# Patient Record
Sex: MALE | Race: WHITE
[De-identification: names, ages, dates, MRNs, and addresses within clinical notes are randomized per-mention and may not be internally consistent; named-entity substitution may affect disease eponyms.]

---

## 2019-06-26 ENCOUNTER — HOSPITAL ENCOUNTER (OUTPATIENT)
Dept: HOSPITAL 83 - LAB | Age: 44
Discharge: HOME | End: 2019-06-26
Attending: FAMILY MEDICINE
Payer: COMMERCIAL
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
## 2019-06-26 DIAGNOSIS — R51: ICD-10-CM

## 2019-06-26 DIAGNOSIS — F17.200: ICD-10-CM

## 2019-06-26 DIAGNOSIS — Z01.818: Primary | ICD-10-CM

## 2019-06-26 DIAGNOSIS — R05: ICD-10-CM
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
## 2019-06-26 DIAGNOSIS — S02.92XA: ICD-10-CM
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
## 2019-06-26 LAB
ALBUMIN SERPL-MCNC: 4.1 GM/DL (ref 3.1–4.5)
ALP SERPL-CCNC: 71 U/L (ref 45–117)
ALT SERPL W P-5'-P-CCNC: 25 U/L (ref 12–78)
AST SERPL-CCNC: 16 IU/L (ref 3–35)
BUN SERPL-MCNC: 14 MG/DL (ref 7–24)
CHLORIDE SERPL-SCNC: 109 MMOL/L (ref 98–107)
CREAT SERPL-MCNC: 1.02 MG/DL (ref 0.7–1.3)
ERYTHROCYTE [DISTWIDTH] IN BLOOD BY AUTOMATED COUNT: 11.9 % (ref 0–14.5)
HCT VFR BLD AUTO: 45 % (ref 42–52)
HGB BLD-MCNC: 15 G/DL (ref 14–18)
MCH RBC QN AUTO: 32.1 PG (ref 27–31)
MCHC RBC AUTO-ENTMCNC: 33.3 G/DL (ref 33–37)
MCV RBC AUTO: 96.4 FL (ref 80–94)
NRBC BLD QL AUTO: 0 % (ref 0–0)
PLATELET # BLD AUTO: 261 10*3/UL (ref 130–400)
PMV BLD AUTO: 10.8 FL (ref 9.6–12.3)
POTASSIUM SERPL-SCNC: 4.3 MMOL/L (ref 3.5–5.1)
PROT SERPL-MCNC: 7.4 GM/DL (ref 6.4–8.2)
RBC # BLD AUTO: 4.67 10*6/UL (ref 4.5–5.9)
SODIUM SERPL-SCNC: 144 MMOL/L (ref 136–145)
WBC NRBC COR # BLD AUTO: 8.6 10*3/UL (ref 4.8–10.8)

## 2020-02-20 ENCOUNTER — HOSPITAL ENCOUNTER (INPATIENT)
Dept: HOSPITAL 83 - ED | Age: 45
LOS: 1 days | Discharge: HOME | DRG: 203 | End: 2020-02-21
Attending: INTERNAL MEDICINE | Admitting: INTERNAL MEDICINE
Payer: COMMERCIAL

## 2020-02-20 VITALS — WEIGHT: 216.38 LBS | BODY MASS INDEX: 30.98 KG/M2 | HEIGHT: 70 IN

## 2020-02-20 VITALS — DIASTOLIC BLOOD PRESSURE: 79 MMHG

## 2020-02-20 VITALS — DIASTOLIC BLOOD PRESSURE: 70 MMHG | SYSTOLIC BLOOD PRESSURE: 108 MMHG

## 2020-02-20 VITALS — DIASTOLIC BLOOD PRESSURE: 73 MMHG

## 2020-02-20 DIAGNOSIS — E78.1: ICD-10-CM

## 2020-02-20 DIAGNOSIS — E66.8: ICD-10-CM

## 2020-02-20 DIAGNOSIS — M54.9: ICD-10-CM

## 2020-02-20 DIAGNOSIS — F41.1: ICD-10-CM

## 2020-02-20 DIAGNOSIS — Z71.6: ICD-10-CM

## 2020-02-20 DIAGNOSIS — Z82.49: ICD-10-CM

## 2020-02-20 DIAGNOSIS — Z79.899: ICD-10-CM

## 2020-02-20 DIAGNOSIS — Z79.82: ICD-10-CM

## 2020-02-20 DIAGNOSIS — F17.210: ICD-10-CM

## 2020-02-20 DIAGNOSIS — Z83.3: ICD-10-CM

## 2020-02-20 DIAGNOSIS — G89.29: ICD-10-CM

## 2020-02-20 DIAGNOSIS — G40.909: ICD-10-CM

## 2020-02-20 DIAGNOSIS — Z80.8: ICD-10-CM

## 2020-02-20 DIAGNOSIS — R07.89: Primary | ICD-10-CM

## 2020-02-20 LAB
ALBUMIN SERPL-MCNC: 3.9 GM/DL (ref 3.1–4.5)
ALP SERPL-CCNC: 85 U/L (ref 45–117)
ALT SERPL W P-5'-P-CCNC: 30 U/L (ref 12–78)
APTT PPP: 27.7 SECONDS (ref 20–32.1)
AST SERPL-CCNC: 21 IU/L (ref 3–35)
BASOPHILS # BLD AUTO: 0 10*3/UL (ref 0–0.1)
BASOPHILS NFR BLD AUTO: 0.4 % (ref 0–1)
BUN SERPL-MCNC: 13 MG/DL (ref 7–24)
CHLORIDE SERPL-SCNC: 108 MMOL/L (ref 98–107)
CREAT SERPL-MCNC: 0.92 MG/DL (ref 0.7–1.3)
EOSINOPHIL # BLD AUTO: 0.1 10*3/UL (ref 0–0.4)
EOSINOPHIL # BLD AUTO: 1.5 % (ref 1–4)
ERYTHROCYTE [DISTWIDTH] IN BLOOD BY AUTOMATED COUNT: 11.7 % (ref 0–14.5)
HCT VFR BLD AUTO: 41.9 % (ref 42–52)
HGB BLD-MCNC: 14.5 G/DL (ref 14–18)
INR BLD: 0.9 (ref 2–3.5)
LYMPHOCYTES # BLD AUTO: 2.9 10*3/UL (ref 1.3–4.4)
LYMPHOCYTES NFR BLD AUTO: 31 % (ref 27–41)
MCH RBC QN AUTO: 32.5 PG (ref 27–31)
MCHC RBC AUTO-ENTMCNC: 34.6 G/DL (ref 33–37)
MCV RBC AUTO: 93.9 FL (ref 80–94)
MONOCYTES # BLD AUTO: 0.7 10*3/UL (ref 0.1–1)
MONOCYTES NFR BLD MANUAL: 7.2 % (ref 3–9)
NEUT #: 5.6 10*3/UL (ref 2.3–7.9)
NEUT %: 59.6 % (ref 47–73)
NRBC BLD QL AUTO: 0 10*3/UL (ref 0–0)
PLATELET # BLD AUTO: 261 10*3/UL (ref 130–400)
PMV BLD AUTO: 10.4 FL (ref 9.6–12.3)
POTASSIUM SERPL-SCNC: 3.8 MMOL/L (ref 3.5–5.1)
PROT SERPL-MCNC: 7.3 GM/DL (ref 6.4–8.2)
RBC # BLD AUTO: 4.46 10*6/UL (ref 4.5–5.9)
SODIUM SERPL-SCNC: 139 MMOL/L (ref 136–145)
TROPONIN I SERPL-MCNC: < 0.015 NG/ML (ref ?–0.04)
WBC NRBC COR # BLD AUTO: 9.4 10*3/UL (ref 4.8–10.8)

## 2020-02-20 NOTE — NUR
A 44, admitted to , under the
services of MARCUS Kaplan MD with a diagnosis of CHEST PAIN WITH LOW RISK
FOR CARDIAC ETIOLOGY.
Chief complaint is CHEST PAIN.
Patient arrived via wheel chair from ER.
Monitor applied. Initial assessment completed.
Vital signs taken and recorded.
MARCUS KAPLAN MD notified of admission to the unit.
Orders received.
See assessment for past medical history, medications
and allergies.
Patient and/or family oriented to unit. [facility]
visitation policy reviewed.
Clothing/patient valuable form completed.
 
ANNA LING

## 2020-02-20 NOTE — NUR
PT MADE AWARE OF PLANS FOR TOMORROW AND TO BE NPO AFTER MIDNIGHT, PT AGREES TO
THIS AND HAS NO QUESTIONS AT THIS TIME

## 2020-02-20 NOTE — NUR
UPDATED MED LIST WITH PT AND HIS WIFE. PT REFUSES TO HAVE HIS BED ALARM ON
EVEN THOUGH WE ADVISED IT TO BE ON. PT IS MADE AWARE OF HOW TO USE THE CALL
LIGHT AND STATES HE DOES NOT NEED ANTHING AT THIS TIME.

## 2020-02-21 VITALS — DIASTOLIC BLOOD PRESSURE: 64 MMHG | SYSTOLIC BLOOD PRESSURE: 108 MMHG

## 2020-02-21 VITALS — SYSTOLIC BLOOD PRESSURE: 101 MMHG | DIASTOLIC BLOOD PRESSURE: 47 MMHG

## 2020-02-21 VITALS — SYSTOLIC BLOOD PRESSURE: 115 MMHG | DIASTOLIC BLOOD PRESSURE: 69 MMHG

## 2020-02-21 LAB
CHOLEST SERPL-MCNC: 204 MG/DL (ref ?–200)
HDLC SERPL-MCNC: 21 MG/DL (ref 40–60)
TRIGL SERPL-MCNC: 676 MG/DL (ref ?–150)

## 2020-02-21 PROCEDURE — 4A02XM4 MEASUREMENT OF CARDIAC TOTAL ACTIVITY, EXTERNAL APPROACH: ICD-10-PCS | Performed by: INTERNAL MEDICINE

## 2020-02-21 PROCEDURE — 3E073KZ INTRODUCTION OF OTHER DIAGNOSTIC SUBSTANCE INTO CORONARY ARTERY, PERCUTANEOUS APPROACH: ICD-10-PCS | Performed by: INTERNAL MEDICINE

## 2020-02-21 NOTE — NUR
CCDIS
Discharge instructions reviewed with patient/family. Patient receptive and
verbalizes understanding. Follow-up care arranged. Written instructions given
to patient/family.
DAVIAN ALVARADO

## 2020-02-21 NOTE — NUR
INFORMED CONSENT OBTAINED FOR LEXISCAN NUCLEAR STRESS TEST WITH DR. MULLER. RESTING EKG NSR WITH A RESTING HR OF 79 WITH BP /60. LUNGS
CLEAR WITH SPO2 OF 99% ON ROOM AIR. PT COMPLETED A 1:00 LEXISCAN PROTOCOL
RECEIVING LEXISCAN 0.4 MG IV OVER 10 SECONDS. HAD NO CHEST PAIN OR ANY EKG ST
CHANGES. DID C/O NAUSEA THEN HAD A SMALL EMESIS THAT WAS RELIEVED END OF
RECOVERY. HAD A PEAK HR  WITH BP OF 90/52. LAST RECOVERY HR  WITH
BP /60. AWAITING SCANNING IN STABLE CONDITION.

## 2020-02-21 NOTE — NUR
in to talk to patient.
Patient states lives at home with his wife and daughter.
There are 12 steps in the home.
Physician: Dr. Delroy Cespedes and Dr. Mk Diaz
Pharmacy: [facility]
Home health services: none
Patient's level of ADLs: INDEPENDENT
Patient has working utilities: YES
DME: none
Follow-up physician's appointment after d/c: he prefers to make his own follow
up appt after discharge
Does patient want to access PORTAL?: no
Discharge plan discussed with patient. He lives at home with his wife and
daughter. He is independent in his ADLs and ambulation. Discussed home health
care services and he denies any home needs at this time. He states his wife
will provide transportation on discharge.
 
MATT JOHNSON

## 2021-04-27 ENCOUNTER — HOSPITAL ENCOUNTER (OUTPATIENT)
Dept: HOSPITAL 83 - LAB | Age: 46
Discharge: HOME | End: 2021-04-27
Attending: FAMILY MEDICINE
Payer: MEDICARE

## 2021-04-27 DIAGNOSIS — E16.2: ICD-10-CM

## 2021-04-27 DIAGNOSIS — R53.83: ICD-10-CM

## 2021-04-27 DIAGNOSIS — E78.00: Primary | ICD-10-CM

## 2021-04-27 LAB
ALBUMIN SERPL-MCNC: 4.1 GM/DL (ref 3.1–4.5)
ALP SERPL-CCNC: 81 U/L (ref 45–117)
ALT SERPL W P-5'-P-CCNC: 28 U/L (ref 12–78)
AST SERPL-CCNC: 15 IU/L (ref 3–35)
BUN SERPL-MCNC: 10 MG/DL (ref 7–24)
CHLORIDE SERPL-SCNC: 106 MMOL/L (ref 98–107)
CHOLEST SERPL-MCNC: 208 MG/DL (ref ?–200)
CREAT SERPL-MCNC: 0.96 MG/DL (ref 0.7–1.3)
ERYTHROCYTE [DISTWIDTH] IN BLOOD BY AUTOMATED COUNT: 11.5 % (ref 0–14.5)
HCT VFR BLD AUTO: 43.3 % (ref 42–52)
HDLC SERPL-MCNC: 21 MG/DL (ref 40–60)
MCH RBC QN AUTO: 31.5 PG (ref 27–31)
MCHC RBC AUTO-ENTMCNC: 33.7 G/DL (ref 33–37)
MCV RBC AUTO: 93.3 FL (ref 80–94)
NRBC BLD QL AUTO: 0 10*3/UL (ref 0–0)
PLATELET # BLD AUTO: 287 10*3/UL (ref 130–400)
PMV BLD AUTO: 10.7 FL (ref 9.6–12.3)
POTASSIUM SERPL-SCNC: 4 MMOL/L (ref 3.5–5.1)
PROT SERPL-MCNC: 7.4 GM/DL (ref 6.4–8.2)
RBC # BLD AUTO: 4.64 10*6/UL (ref 4.5–5.9)
SODIUM SERPL-SCNC: 136 MMOL/L (ref 136–145)
TRIGL SERPL-MCNC: 577 MG/DL (ref ?–150)
WBC NRBC COR # BLD AUTO: 8 10*3/UL (ref 4.8–10.8)

## 2025-05-02 ENCOUNTER — HOSPITAL ENCOUNTER (OUTPATIENT)
Age: 50
Discharge: HOME | End: 2025-05-02

## 2025-05-02 LAB
ANION GAP SERPL CALCULATED.3IONS-SCNC: 14 MMOL/L (ref 7–16)
BUN SERPL-MCNC: 12 MG/DL (ref 6–20)
CALCIUM SERPL-MCNC: 9.1 MG/DL (ref 8.6–10.2)
CHLORIDE SERPL-SCNC: 105 MMOL/L (ref 98–107)
CO2 SERPL-SCNC: 21 MMOL/L (ref 22–29)
CREAT SERPL-MCNC: 0.9 MG/DL (ref 0.7–1.2)
ERYTHROCYTE [DISTWIDTH] IN BLOOD BY AUTOMATED COUNT: 11.9 % (ref 11.5–15)
GFR, ESTIMATED: >90 ML/MIN/1.73M2
GLUCOSE SERPL-MCNC: 110 MG/DL (ref 74–99)
HBA1C MFR BLD: 5.6 % (ref 4–5.6)
HCT VFR BLD AUTO: 48.7 % (ref 37–54)
HGB BLD-MCNC: 16 G/DL (ref 12.5–16.5)
MCH RBC QN AUTO: 31.8 PG (ref 26–35)
MCHC RBC AUTO-ENTMCNC: 32.9 G/DL (ref 32–34.5)
MCV RBC AUTO: 96.8 FL (ref 80–99.9)
PLATELET # BLD AUTO: 189 K/UL (ref 130–450)
PMV BLD AUTO: 12.2 FL (ref 7–12)
POTASSIUM SERPL-SCNC: 4.1 MMOL/L (ref 3.5–5)
RBC # BLD AUTO: 5.03 M/UL (ref 3.8–5.8)
SODIUM SERPL-SCNC: 140 MMOL/L (ref 132–146)
WBC OTHER # BLD: 5.9 K/UL (ref 4.5–11.5)

## 2025-05-02 PROCEDURE — 87081 CULTURE SCREEN ONLY: CPT

## 2025-05-02 PROCEDURE — 85027 COMPLETE CBC AUTOMATED: CPT

## 2025-05-02 PROCEDURE — 83036 HEMOGLOBIN GLYCOSYLATED A1C: CPT

## 2025-05-02 PROCEDURE — 80048 BASIC METABOLIC PNL TOTAL CA: CPT

## 2025-05-05 LAB
MICROORGANISM SPEC CULT: NORMAL
SPECIMEN DESCRIPTION: NORMAL